# Patient Record
Sex: MALE | URBAN - METROPOLITAN AREA
[De-identification: names, ages, dates, MRNs, and addresses within clinical notes are randomized per-mention and may not be internally consistent; named-entity substitution may affect disease eponyms.]

---

## 2018-12-19 ENCOUNTER — OFFICE VISIT (OUTPATIENT)
Dept: HEMATOLOGY | Age: 48
End: 2018-12-19

## 2018-12-19 VITALS
TEMPERATURE: 97.9 F | BODY MASS INDEX: 27.32 KG/M2 | HEART RATE: 70 BPM | WEIGHT: 195.13 LBS | DIASTOLIC BLOOD PRESSURE: 105 MMHG | SYSTOLIC BLOOD PRESSURE: 175 MMHG | OXYGEN SATURATION: 97 % | HEIGHT: 71 IN

## 2018-12-19 DIAGNOSIS — R74.8 ELEVATED LIVER ENZYMES: ICD-10-CM

## 2018-12-19 DIAGNOSIS — R74.8 ELEVATED LIVER ENZYMES: Primary | ICD-10-CM

## 2018-12-19 PROBLEM — I10 HYPERTENSION: Status: ACTIVE | Noted: 2018-12-19

## 2018-12-19 PROBLEM — K51.90 ULCERATIVE COLITIS (HCC): Status: ACTIVE | Noted: 2018-12-19

## 2018-12-19 PROBLEM — Z87.19 H/O UMBILICAL HERNIA REPAIR: Status: ACTIVE | Noted: 2018-12-19

## 2018-12-19 PROBLEM — Z98.890 H/O UMBILICAL HERNIA REPAIR: Status: ACTIVE | Noted: 2018-12-19

## 2018-12-19 PROBLEM — F98.8 ADD (ATTENTION DEFICIT DISORDER): Status: ACTIVE | Noted: 2018-12-19

## 2018-12-19 PROBLEM — K50.10 CROHN'S COLITIS (HCC): Status: ACTIVE | Noted: 2018-12-19

## 2018-12-19 PROBLEM — F32.A DEPRESSION: Status: ACTIVE | Noted: 2018-12-19

## 2018-12-19 RX ORDER — PREDNISONE 20 MG/1
TABLET ORAL
COMMUNITY
End: 2019-02-26

## 2018-12-19 RX ORDER — DEXTROAMPHETAMINE SACCHARATE, AMPHETAMINE ASPARTATE, DEXTROAMPHETAMINE SULFATE AND AMPHETAMINE SULFATE 5; 5; 5; 5 MG/1; MG/1; MG/1; MG/1
20 TABLET ORAL
COMMUNITY
End: 2019-02-26

## 2018-12-19 RX ORDER — MIRTAZAPINE 30 MG/1
TABLET, FILM COATED ORAL
COMMUNITY
End: 2019-02-26

## 2018-12-19 RX ORDER — BUPROPION HYDROCHLORIDE 300 MG/1
300 TABLET ORAL
COMMUNITY
End: 2019-02-26

## 2018-12-19 RX ORDER — ESCITALOPRAM OXALATE 10 MG/1
10 TABLET ORAL DAILY
COMMUNITY
End: 2019-02-26

## 2018-12-19 RX ORDER — URSODIOL 500 MG/1
500 TABLET, FILM COATED ORAL 2 TIMES DAILY
COMMUNITY
End: 2019-02-26

## 2018-12-19 NOTE — PROGRESS NOTES
[facility], MD, Raghav Medina, Sindy Long [place], [place]       VICKY Muñoz, CHARMAINE Johnson, [facility]   Veronda Olives, NP Rachell Crigler, NP [facility]    at [facility]    [facility], [address] [place] [address], [facility] 22.    [phone]    FAX: [address] [place] [address]    at [address], [address]    [phone]    FAX: [phone]       Patient Care Team:  Levy Garcia as PCP - General (Physician Assistant)  Dominick Gonzalez MD (Gastroenterology)      Problem List  Date Reviewed: 12/19/2018          Codes Class Noted    Ulcerative colitis [facility]) ICD-10-CM: K51.90  ICD-9-CM: 556.9  12/19/2018        [facility] ([facility] ICD-10-CM: K50.10  ICD-9-CM: 555.1  12/19/2018        Hypertension ICD-10-CM: I10  ICD-9-CM: 401.9  12/19/2018        ADD (attention deficit disorder) ICD-10-CM: F98.8  ICD-9-CM: 314.00  12/19/2018        Depression ICD-10-CM: F32.9  ICD-9-CM: 214  23/45/0492        H/O umbilical hernia repair K-47-: R62.237, Z87.19  ICD-9-CM: V15.29  12/19/2018              The clinicians listed above have asked me to see Britney Bundy in consultation regarding elevated liver enzymes and its management. All medical records sent by the referring physicians were reviewed including imaging studies and pathology. The patient is a 50 y.o.  male who was first noted to have abnormalities in alkaline phosphatase in 11/2018. The patient was started on Intyvio for UC in 6/2018. He has had a dramatic improvement in UC symptoms with this. Serologic evaluation for markers of chronic liver disease was positive for ASMA,     He was placed on prednisone and TEE. MRI with MRCP of the liver was performed in 12/2018. The results of the imaging demonstrated a normal appearing liver. MRCP demonstrated no bile duct strictures. A liver biopsy was performed in 12/2018. This demonstrated mild portal inflammation and no significant fibrosis. The patient has no symptoms which can be attributed to the liver disorder. The patient has not experienced fatigue, pain in the right side over the liver, itching,     The patient Has limitations in activites secondary which resolved when he was started on prednisone. ASSESSMENT AND PLAN:  Acute and rising ALP  Liver transaminases are elevated. ALP is elevated. Liver function is normal.  The platelet count is normal.    The liver enzymes started after starting Intyvio for treatment of UC. MRCP does not show evidence of PSC. A liver biopsy shows mild focal portal inflammation and some mild bile duct injury. Will need to get the liver biopsy slides for my own review. Serologic testing for causes of chronic liver disease were negative. Will perform additional serologic tests to screen for other causes of chronic liver disease. The most likely causes for the liver chemistry abnormalities were discussed with the patient and include medications and immunologic reaction to the biologic    I would suggest stopping Intyvio for now and monitoring the liver enzymes. The patient was started on prednisone. I suspect this was to treat the UC and not to treat immune related liver disease thought to be stimulated by Intyvio. If prednsione if for the UC he can remain on this. The patient was started on TEE. There is no role for TEE in the treatment of cholestatic DILI. We do not think he has PBC. He can either stay on this for now or stop this agent. Treatment of other medical problems in patients with chronic liver disease  There are no contraindications for the patient to take any medications that are necessary for treatment of other medical issues.     The patient does not consume alcohol daily. Normal doses of acetaminophen can be used for pain as needed. Normal doses of acetaminophen as recommended on the label are not hepatotoxic, even in patients with cirrhosis. Counseling for alcohol in patients with chronic liver disease  The patient was counseled regarding alcohol consumption and the effect of alcohol on chronic liver disease. The patient does not consume any significant amount of alcohol. Vaccinations   Vaccination for viral hepatitis A and B is recommended since the patient has no serologic evidence of previous exposure or vaccination with immunity. Routine vaccinations against other bacterial and viral agents can be performed as indicated. Annual flu vaccination should be administered if indicated. ALLERGIES  Allergies   Allergen Reactions    Humira [Adalimumab] Other (comments)     arthritis    Remicade [Infliximab] Other (comments)     Psoriasis of skin       MEDICATIONS  Current Outpatient Medications   Medication Sig    predniSONE (DELTASONE) 20 mg tablet Take  by mouth daily (with breakfast).  ursodiol (ACTIGALL) 500 mg tablet Take 500 mg by mouth two (2) times a day.  buPROPion XL (WELLBUTRIN XL) 300 mg XL tablet Take 300 mg by mouth every morning.  dextroamphetamine-amphetamine (ADDERALL) 20 mg tablet Take 20 mg by mouth.  multivitamin, tx-iron-ca-min (THERA-M W/ IRON) 9 mg iron-400 mcg tab tablet Take 1 Tab by mouth daily.  mirtazapine (REMERON) 30 mg tablet Take  by mouth nightly.  escitalopram oxalate (LEXAPRO) 10 mg tablet Take 10 mg by mouth daily. No current facility-administered medications for this visit. SYSTEM REVIEW NOT RELATED TO LIVER DISEASE OR REVIEWED ABOVE:  Constitution systems: Has been feeling poorly. Eyes: Negative for visual changes. ENT: Negative for sore throat, painful swallowing. Respiratory: Negative for cough, hemoptysis, SOB.    Cardiology: Negative for chest pain, palpitations. GI:  Negative for constipation or diarrhea. : Negative for urinary frequency, dysuria, hematuria, nocturia. Skin: Negative for rash. Hematology: Negative for easy bruising, blood clots. Musculo-skelatal: Diffuse joint pain. Resolved when started prednisone. Neurologic: Negative for headaches, dizziness, vertigo, memory problems not related to HE. Psychology: Negative for anxiety, depression. FAMILY HISTORY:  The father is  of cancer. The mother is  of heart disease. There is no family history of liver disease. SOCIAL HISTORY:  The patient is . The patient has no children. The patient has never used tobacco products. The patient has never consumed significant amounts of alcohol. The patient currently works full time as real estate sales. PHYSICAL EXAMINATION:  Visit Vitals  BP (!) 175/105   Pulse 70   Temp 97.9 °F (36.6 °C) (Tympanic)   Ht 5' 11\" (1.803 m)   Wt 195 lb 2 oz (88.5 kg)   SpO2 97%   BMI 27.21 kg/m²     General: No acute distress. Eyes: Sclera anicteric. ENT: No oral lesions. Thyroid normal.  Nodes: No adenopathy. Skin: No spider angiomata. No jaundice. No palmar erythema. Respiratory: Lungs clear to auscultation. Cardiovascular: Regular heart rate. No murmurs. No JVD. Abdomen: Soft non-tender. Liver size normal to percussion/palpation. Spleen not palpable. No obvious ascites. Extremities: No edema. No muscle wasting. No gross arthritic changes. Neurologic: Alert and oriented. Cranial nerves grossly intact. No asterixis. LABORATORY STUDIES:  From 2018  AST/ALT/ALP/T Bili/ALB:  14/19/96/0.1/3.9    From 2018  AST/ALT/ALP/T Bili/ALB:  26/50/474/0.2/3.7    From 2018  AST/ALT/ALP/T Bili/ALB:  39/64/605/0.3/3.7    From 2018  AST/ALT/ALP/T Bili/ALB:  88/36/906/0.4/3.5  WBC/HB/PLT/INR:  17.9/9.4/728  BUN/CREAT:  11/0.9    SEROLOGIES:  2018.   HAV total negative, HBsAntigen negative, anti-HBcore negative, anti-HBsurface negative, anti-HCV negative, HCV RNA negative, ASMA positive, AMA negative, ceruloplsmin 38, Anti-HIV negative    LIVER HISTOLOGY:  12/2018. Rare chronic portal inflammation with focal bile duct injury. Biopsy slides not reviewed by MLS. ENDOSCOPIC PROCEDURES:  Not available or performed    RADIOLOGY:  11/2018. Ultrasound of liver. Echogenic consistent with chronic liver disease. No liver mass lesions. No dilated bile ducts. No ascites. 12/2018. Dynamic MRI of liver. Normal appearing liver. No liver mass lesions. Normal spleen. No dilated bile ducts. No bile duct strictures. No ascites. OTHER TESTING:  Not available or performed    FOLLOW-UP:  All of the issues listed above in the Assessment and Plan were discussed with the patient. All questions were answered. The patient expressed a clear understanding of the above. [address] [address] in 4 weeks to review all data and determine the treatment plan.     Lei Lomax MD  [address]  [address] [address] Eryn Narayanan, [address]  [address] [place] [address]

## 2019-01-05 LAB
A-G RATIO,AGRAT: 1.3 RATIO (ref 1.1–2.6)
ABSOLUTE LYMPHOCYTE COUNT, 10803: 1.1 K/UL (ref 1–4.8)
ALBUMIN SERPL-MCNC: 4.2 G/DL (ref 3.5–5)
ALP SERPL-CCNC: 166 U/L (ref 25–115)
ALT SERPL-CCNC: 22 U/L (ref 5–40)
ANION GAP SERPL CALC-SCNC: 20 MMOL/L
ANISOCYTOSIS: ABNORMAL
AST SERPL W P-5'-P-CCNC: 15 U/L (ref 10–37)
BASOPHILS # BLD: 0 K/UL (ref 0–0.2)
BASOPHILS NFR BLD: 0 % (ref 0–2)
BILIRUB SERPL-MCNC: 0.3 MG/DL (ref 0.2–1.2)
BILIRUBIN, DIRECT,CBIL: <0.2 MG/DL (ref 0–0.3)
BUN SERPL-MCNC: 16 MG/DL (ref 6–22)
CALCIUM SERPL-MCNC: 8.8 MG/DL (ref 8.4–10.4)
CHLORIDE SERPL-SCNC: 89 MMOL/L (ref 98–110)
CO2 SERPL-SCNC: 26 MMOL/L (ref 20–32)
CREAT SERPL-MCNC: 1.1 MG/DL (ref 0.5–1.2)
EOSINOPHIL # BLD: 0 K/UL (ref 0–0.5)
EOSINOPHIL NFR BLD: 0 % (ref 0–6)
ERYTHROCYTE [DISTWIDTH] IN BLOOD BY AUTOMATED COUNT: 20.3 % (ref 10–15.5)
GFRAA, 66117: >60
GFRNA, 66118: >60
GLOBULIN,GLOB: 3.3 G/DL (ref 2–4)
GLUCOSE SERPL-MCNC: 125 MG/DL (ref 70–99)
GRANULOCYTES,GRANS: 90 % (ref 40–75)
HCT VFR BLD AUTO: 33 % (ref 39.3–51.6)
HGB BLD-MCNC: 10.7 G/DL (ref 13.1–17.2)
LYMPHOCYTES, LYMLT: 7 % (ref 20–45)
MCH RBC QN AUTO: 26 PG (ref 26–34)
MCHC RBC AUTO-ENTMCNC: 32 G/DL (ref 31–36)
MCV RBC AUTO: 80 FL (ref 80–95)
MICROCYTES, 12013: ABNORMAL
MONOCYTES # BLD: 0.5 K/UL (ref 0.1–1)
MONOCYTES NFR BLD: 3 % (ref 3–12)
NEUTROPHILS # BLD AUTO: 14.8 K/UL (ref 1.8–7.7)
NORMACHROMIC RBC, 868: ABNORMAL
PLATELET # BLD AUTO: 298 K/UL (ref 140–440)
PMV BLD AUTO: 8.4 FL (ref 9–13)
POTASSIUM SERPL-SCNC: 3.5 MMOL/L (ref 3.5–5.5)
PROT SERPL-MCNC: 7.5 G/DL (ref 6.4–8.3)
RBC # BLD AUTO: 4.13 M/UL (ref 3.8–5.8)
SMEAR EVAL, 1131: ABNORMAL
SODIUM SERPL-SCNC: 135 MMOL/L (ref 133–145)
WBC # BLD AUTO: 16.4 K/UL (ref 4–11)

## 2019-01-06 LAB — MICHOCHONDRIAL (M2) AB, 006652: 4.2 UNITS

## 2019-01-07 LAB
ANA HOMOGENEOUS, 8012: NEGATIVE TITER
ANA NUCLEOLAR, 8013: NEGATIVE TITER
ANA PERIPHERAL, 8014: NEGATIVE TITER
ANA SPECKLED, 8011: NEGATIVE TITER
CENTROMERE ANTIBODIES, 8254: NEGATIVE TITER